# Patient Record
Sex: FEMALE | Race: WHITE | NOT HISPANIC OR LATINO | URBAN - METROPOLITAN AREA
[De-identification: names, ages, dates, MRNs, and addresses within clinical notes are randomized per-mention and may not be internally consistent; named-entity substitution may affect disease eponyms.]

---

## 2020-01-08 ENCOUNTER — OFFICE VISIT (OUTPATIENT)
Dept: OBSTETRICS AND GYNECOLOGY | Facility: CLINIC | Age: 30
End: 2020-01-08
Payer: COMMERCIAL

## 2020-01-08 VITALS
HEART RATE: 102 BPM | SYSTOLIC BLOOD PRESSURE: 134 MMHG | BODY MASS INDEX: 26.93 KG/M2 | HEIGHT: 65 IN | DIASTOLIC BLOOD PRESSURE: 85 MMHG | WEIGHT: 161.63 LBS

## 2020-01-08 DIAGNOSIS — Z00.00 PHYSICAL EXAM, ANNUAL: Primary | ICD-10-CM

## 2020-01-08 DIAGNOSIS — I82.90: ICD-10-CM

## 2020-01-08 LAB
ABS NRBC COUNT: 0 X 10 3/UL (ref 0–0.01)
ABSOLUTE BASOPHIL: 0.05 X 10 3/UL (ref 0–0.22)
ABSOLUTE EOSINOPHIL: 0.09 X 10 3/UL (ref 0.04–0.54)
ABSOLUTE IMMATURE GRAN: 0.03 X 10 3/UL (ref 0–0.04)
ABSOLUTE LYMPHOCYTE: 1.91 X 10 3/UL (ref 0.86–4.75)
ABSOLUTE MONOCYTE: 0.77 X 10 3/UL (ref 0.22–1.08)
BASOPHILS NFR BLD: 0.5 % (ref 0.2–1.2)
EOSINOPHIL NFR BLD: 0.9 % (ref 0.7–7)
HCT VFR BLD AUTO: 42.8 % (ref 37–47)
HGB BLD-MCNC: 14.2 G/DL (ref 12–16)
IMMATURE GRANULOCYTES: 0.3 % (ref 0–0.5)
INR PPP: 0.93 (ref 0.85–1.07)
LYMPHOCYTES NFR BLD: 18.8 % (ref 19.3–53.1)
MCH RBC QN AUTO: 34.1 PG (ref 27–32)
MCHC RBC AUTO-ENTMCNC: 33.2 G/DL (ref 32–36)
MCV RBC AUTO: 102.6 FL (ref 82–100)
MONOCYTES NFR BLD: 7.6 % (ref 4.7–12.5)
NEUTROPHILS ABSOLUTE COUNT: 7.32 X 10 3/UL (ref 2.15–7.56)
NEUTROPHILS NFR BLD: 71.9 %
NUCLEATED RED BLOOD CELLS: 0 /100 WBC (ref 0–0.2)
PLATELET # BLD AUTO: 345 X 10 3/UL (ref 135–400)
PROTIME: 10.1 SECONDS (ref 9.3–11.6)
RBC # BLD AUTO: 4.17 X 10 6/UL (ref 4.2–5.4)
RDW-SD: 49 FL (ref 37–54)
WBC # BLD: 10.17 X 10 3/UL (ref 4.3–10.8)

## 2020-01-08 PROCEDURE — 99395 PREV VISIT EST AGE 18-39: CPT | Mod: S$GLB,,, | Performed by: OBSTETRICS & GYNECOLOGY

## 2020-01-08 PROCEDURE — 99395 PR PREVENTIVE VISIT,EST,18-39: ICD-10-PCS | Mod: S$GLB,,, | Performed by: OBSTETRICS & GYNECOLOGY

## 2020-01-08 RX ORDER — BUPROPION HYDROCHLORIDE 300 MG/1
300 TABLET ORAL DAILY
COMMUNITY

## 2020-01-08 NOTE — PROGRESS NOTES
Subjective:       Patient ID: Alana Wilson is a 29 y.o. female.    Chief Complaint: Annual Exam    Patient is here for annual examination she has no periods has a Mirena IUD in for over 2 years.  Since she had a lot of pain with insertion.  She is considering other methods however I have discussed with her birth control pills she can't take she has a past history of a blood clot in her left ankle she was treated with anticoagulants for 6 months I told her was due to birth control pills she had no blood workup will draw protein S protein C PT and CBC    Review of Systems   Constitutional: Negative for activity change, appetite change, chills, fever and unexpected weight change.   HENT: Negative for congestion, dental problem, facial swelling, hearing loss and mouth sores.    Respiratory: Negative for apnea, chest tightness, shortness of breath and wheezing.    Cardiovascular: Negative for chest pain and leg swelling.   Gastrointestinal: Negative for abdominal distention, abdominal pain, anal bleeding, blood in stool, constipation, diarrhea, nausea, rectal pain and vomiting.   Genitourinary: Negative for decreased urine volume, difficulty urinating, dyspareunia, dysuria, enuresis, flank pain, frequency, genital sores, hematuria, menstrual problem, pelvic pain, urgency, vaginal bleeding, vaginal discharge and vaginal pain.   Musculoskeletal: Negative for arthralgias, back pain, joint swelling, myalgias and neck pain.   Skin: Negative for color change, pallor, rash and wound.   Allergic/Immunologic: Negative for immunocompromised state.   Neurological: Negative for dizziness, light-headedness and headaches.   Hematological: Negative for adenopathy. Does not bruise/bleed easily.   Psychiatric/Behavioral: Negative for agitation, behavioral problems, confusion, sleep disturbance and suicidal ideas. The patient is not nervous/anxious and is not hyperactive.        Objective:      Physical Exam   Constitutional: She is  oriented to person, place, and time. She appears well-developed and well-nourished.   HENT:   Head: Normocephalic.   Nose: Nose normal.   Eyes: Pupils are equal, round, and reactive to light. Conjunctivae and EOM are normal.   Neck: Normal range of motion. Neck supple.   Cardiovascular: Normal rate, regular rhythm, normal heart sounds and intact distal pulses.   Pulmonary/Chest: Effort normal and breath sounds normal.   Abdominal: Soft. Bowel sounds are normal.   Genitourinary: Vagina normal and uterus normal.   Genitourinary Comments: String is visible bimanual normal   Musculoskeletal: Normal range of motion.   Neurological: She is alert and oriented to person, place, and time.   Skin: Skin is warm and dry.   Psychiatric: She has a normal mood and affect. Her behavior is normal. Thought content normal.       Assessment:       1. Physical exam, annual        Plan:     plan it blood work make sure she discusses pregnancy before she gets pregnant

## 2020-01-08 NOTE — LETTER
January 8, 2020      [facility]  [address]  [facility] [address]           [facility] [place] [facility]  [address]  [place] [address]  Phone: [phone]  Fax: [phone]          Patient: Alana Wilson   MR Number: [id number]   YOB: 1990   Date of Visit: 1/8/2020       Dear [facility]:    Thank you for referring Alana Wilson to me for evaluation. Attached you will find relevant portions of my assessment and plan of care.    If you have questions, please do not hesitate to call me. I look forward to following Alana Wilson along with you.    Sincerely,    Dave Jane MD    Enclosure  CC:  No Recipients    If you would like to receive this communication electronically, please contact [email or web address] or [phone] to request more information on [facility] access.    For providers and/or their staff who would like to refer a patient to [facility], please contact us through our one-stop-shop provider referral line, [facility], at [phone].    If you feel you have received this communication in error or would no longer like to receive these types of communications, please e-mail [email or web address]

## 2020-01-12 LAB
PROT C PPP-ACNC: 73 % NORMAL (ref 70–180)
PROT S ACT/NOR PPP: 111 % NORMAL (ref 60–140)

## 2022-05-05 DIAGNOSIS — J45.909 ASTHMA, UNSPECIFIED ASTHMA SEVERITY, UNSPECIFIED WHETHER COMPLICATED, UNSPECIFIED WHETHER PERSISTENT: Primary | ICD-10-CM

## 2022-08-29 ENCOUNTER — TELEPHONE (OUTPATIENT)
Dept: PULMONOLOGY | Facility: CLINIC | Age: 32
End: 2022-08-29

## 2022-08-29 NOTE — TELEPHONE ENCOUNTER
Called pt to confirm appt, no answer. Also faxed chest xray to [facility] and [facility] [place].

## 2022-08-31 ENCOUNTER — TELEPHONE (OUTPATIENT)
Dept: PULMONOLOGY | Facility: CLINIC | Age: 32
End: 2022-08-31

## 2022-08-31 NOTE — TELEPHONE ENCOUNTER
Confirmed appt date and time with pt.    Introduction Text (Please End With A Colon): The following procedure was deferred: Procedure To Be Performed At Next Visit: PDT Blue Light Detail Level: Zone

## 2022-09-06 ENCOUNTER — TELEPHONE (OUTPATIENT)
Dept: PULMONOLOGY | Facility: CLINIC | Age: 32
End: 2022-09-06

## 2022-09-06 NOTE — TELEPHONE ENCOUNTER
Called number listed below and phone number is not accepting calls at this time and left detailed message to return call.

## 2022-09-06 NOTE — TELEPHONE ENCOUNTER
----- Message from Monique Mays sent at 9/6/2022  9:30 AM CDT -----  [facility]an[facility], will elaborate...[phone]